# Patient Record
Sex: MALE | Race: OTHER | URBAN - METROPOLITAN AREA
[De-identification: names, ages, dates, MRNs, and addresses within clinical notes are randomized per-mention and may not be internally consistent; named-entity substitution may affect disease eponyms.]

---

## 2023-08-28 ENCOUNTER — EMERGENCY (EMERGENCY)
Facility: HOSPITAL | Age: 63
LOS: 0 days | Discharge: HOME | End: 2023-08-28
Attending: STUDENT IN AN ORGANIZED HEALTH CARE EDUCATION/TRAINING PROGRAM
Payer: MEDICAID

## 2023-08-28 VITALS
OXYGEN SATURATION: 96 % | WEIGHT: 160.06 LBS | HEART RATE: 99 BPM | SYSTOLIC BLOOD PRESSURE: 141 MMHG | DIASTOLIC BLOOD PRESSURE: 82 MMHG | HEIGHT: 68 IN | TEMPERATURE: 100 F | RESPIRATION RATE: 20 BRPM

## 2023-08-28 VITALS
SYSTOLIC BLOOD PRESSURE: 150 MMHG | RESPIRATION RATE: 20 BRPM | TEMPERATURE: 99 F | DIASTOLIC BLOOD PRESSURE: 84 MMHG | HEART RATE: 90 BPM | OXYGEN SATURATION: 96 %

## 2023-08-28 DIAGNOSIS — Z20.822 CONTACT WITH AND (SUSPECTED) EXPOSURE TO COVID-19: ICD-10-CM

## 2023-08-28 DIAGNOSIS — R07.89 OTHER CHEST PAIN: ICD-10-CM

## 2023-08-28 DIAGNOSIS — M79.10 MYALGIA, UNSPECIFIED SITE: ICD-10-CM

## 2023-08-28 DIAGNOSIS — B34.9 VIRAL INFECTION, UNSPECIFIED: ICD-10-CM

## 2023-08-28 DIAGNOSIS — E78.5 HYPERLIPIDEMIA, UNSPECIFIED: ICD-10-CM

## 2023-08-28 DIAGNOSIS — R11.0 NAUSEA: ICD-10-CM

## 2023-08-28 DIAGNOSIS — R51.9 HEADACHE, UNSPECIFIED: ICD-10-CM

## 2023-08-28 DIAGNOSIS — I10 ESSENTIAL (PRIMARY) HYPERTENSION: ICD-10-CM

## 2023-08-28 DIAGNOSIS — E11.9 TYPE 2 DIABETES MELLITUS WITHOUT COMPLICATIONS: ICD-10-CM

## 2023-08-28 LAB
ALBUMIN SERPL ELPH-MCNC: 3.6 G/DL — SIGNIFICANT CHANGE UP (ref 3.3–5)
ALP SERPL-CCNC: 80 U/L — SIGNIFICANT CHANGE UP (ref 40–120)
ALT FLD-CCNC: 70 U/L — SIGNIFICANT CHANGE UP (ref 12–78)
ANION GAP SERPL CALC-SCNC: 7 MMOL/L — SIGNIFICANT CHANGE UP (ref 5–17)
AST SERPL-CCNC: 48 U/L — HIGH (ref 15–37)
BASOPHILS # BLD AUTO: 0.01 K/UL — SIGNIFICANT CHANGE UP (ref 0–0.2)
BASOPHILS NFR BLD AUTO: 0.2 % — SIGNIFICANT CHANGE UP (ref 0–2)
BILIRUB SERPL-MCNC: 0.7 MG/DL — SIGNIFICANT CHANGE UP (ref 0.2–1.2)
BUN SERPL-MCNC: 13 MG/DL — SIGNIFICANT CHANGE UP (ref 7–23)
CALCIUM SERPL-MCNC: 8.6 MG/DL — SIGNIFICANT CHANGE UP (ref 8.5–10.1)
CHLORIDE SERPL-SCNC: 97 MMOL/L — SIGNIFICANT CHANGE UP (ref 96–108)
CO2 SERPL-SCNC: 26 MMOL/L — SIGNIFICANT CHANGE UP (ref 22–31)
CREAT SERPL-MCNC: 1.07 MG/DL — SIGNIFICANT CHANGE UP (ref 0.5–1.3)
EGFR: 78 ML/MIN/1.73M2 — SIGNIFICANT CHANGE UP
EOSINOPHIL # BLD AUTO: 0 K/UL — SIGNIFICANT CHANGE UP (ref 0–0.5)
EOSINOPHIL NFR BLD AUTO: 0 % — SIGNIFICANT CHANGE UP (ref 0–6)
GLUCOSE SERPL-MCNC: 201 MG/DL — HIGH (ref 70–99)
HCT VFR BLD CALC: 41.1 % — SIGNIFICANT CHANGE UP (ref 39–50)
HGB BLD-MCNC: 14.2 G/DL — SIGNIFICANT CHANGE UP (ref 13–17)
IMM GRANULOCYTES NFR BLD AUTO: 0.3 % — SIGNIFICANT CHANGE UP (ref 0–0.9)
LIDOCAIN IGE QN: 147 U/L — HIGH (ref 13–75)
LYMPHOCYTES # BLD AUTO: 0.67 K/UL — LOW (ref 1–3.3)
LYMPHOCYTES # BLD AUTO: 11.1 % — LOW (ref 13–44)
MAGNESIUM SERPL-MCNC: 2.1 MG/DL — SIGNIFICANT CHANGE UP (ref 1.6–2.6)
MCHC RBC-ENTMCNC: 30 PG — SIGNIFICANT CHANGE UP (ref 27–34)
MCHC RBC-ENTMCNC: 34.5 G/DL — SIGNIFICANT CHANGE UP (ref 32–36)
MCV RBC AUTO: 86.9 FL — SIGNIFICANT CHANGE UP (ref 80–100)
MONOCYTES # BLD AUTO: 0.66 K/UL — SIGNIFICANT CHANGE UP (ref 0–0.9)
MONOCYTES NFR BLD AUTO: 10.9 % — SIGNIFICANT CHANGE UP (ref 2–14)
NEUTROPHILS # BLD AUTO: 4.67 K/UL — SIGNIFICANT CHANGE UP (ref 1.8–7.4)
NEUTROPHILS NFR BLD AUTO: 77.5 % — HIGH (ref 43–77)
NRBC # BLD: 0 /100 WBCS — SIGNIFICANT CHANGE UP (ref 0–0)
PLATELET # BLD AUTO: 161 K/UL — SIGNIFICANT CHANGE UP (ref 150–400)
POTASSIUM SERPL-MCNC: 3.7 MMOL/L — SIGNIFICANT CHANGE UP (ref 3.5–5.3)
POTASSIUM SERPL-SCNC: 3.7 MMOL/L — SIGNIFICANT CHANGE UP (ref 3.5–5.3)
PROT SERPL-MCNC: 7.3 GM/DL — SIGNIFICANT CHANGE UP (ref 6–8.3)
RAPID RVP RESULT: SIGNIFICANT CHANGE UP
RBC # BLD: 4.73 M/UL — SIGNIFICANT CHANGE UP (ref 4.2–5.8)
RBC # FLD: 11.9 % — SIGNIFICANT CHANGE UP (ref 10.3–14.5)
SARS-COV-2 RNA SPEC QL NAA+PROBE: SIGNIFICANT CHANGE UP
SODIUM SERPL-SCNC: 130 MMOL/L — LOW (ref 135–145)
TROPONIN I, HIGH SENSITIVITY RESULT: 5.6 NG/L — SIGNIFICANT CHANGE UP
WBC # BLD: 6.03 K/UL — SIGNIFICANT CHANGE UP (ref 3.8–10.5)
WBC # FLD AUTO: 6.03 K/UL — SIGNIFICANT CHANGE UP (ref 3.8–10.5)

## 2023-08-28 PROCEDURE — 71045 X-RAY EXAM CHEST 1 VIEW: CPT | Mod: 26

## 2023-08-28 PROCEDURE — 93010 ELECTROCARDIOGRAM REPORT: CPT

## 2023-08-28 PROCEDURE — 99285 EMERGENCY DEPT VISIT HI MDM: CPT

## 2023-08-28 RX ORDER — ACETAMINOPHEN 500 MG
1000 TABLET ORAL ONCE
Refills: 0 | Status: COMPLETED | OUTPATIENT
Start: 2023-08-28 | End: 2023-08-28

## 2023-08-28 RX ORDER — SODIUM CHLORIDE 9 MG/ML
1000 INJECTION INTRAMUSCULAR; INTRAVENOUS; SUBCUTANEOUS ONCE
Refills: 0 | Status: COMPLETED | OUTPATIENT
Start: 2023-08-28 | End: 2023-08-28

## 2023-08-28 RX ORDER — METOCLOPRAMIDE HCL 10 MG
10 TABLET ORAL ONCE
Refills: 0 | Status: COMPLETED | OUTPATIENT
Start: 2023-08-28 | End: 2023-08-28

## 2023-08-28 RX ADMIN — SODIUM CHLORIDE 1000 MILLILITER(S): 9 INJECTION INTRAMUSCULAR; INTRAVENOUS; SUBCUTANEOUS at 17:10

## 2023-08-28 RX ADMIN — Medication 400 MILLIGRAM(S): at 17:10

## 2023-08-28 RX ADMIN — Medication 10 MILLIGRAM(S): at 18:44

## 2023-08-28 NOTE — ED PROVIDER NOTE - CLINICAL SUMMARY MEDICAL DECISION MAKING FREE TEXT BOX
63M with PMH DM2, HTN, HLD who presents to ED with fever/chills x yesterday associated with myalgias, nausea, headaches, and chest pressure. Pt reporting fever, didn't check temperature at home, reporting chills overnight during sleep. Reporting intermittent midsternal chest pressure, non-exertional/non-pleuritic/non-radiating in nature, pt hasn't taken any OTC medications for relief. No known ill contacts, no recent travel, no recent illness. Patient currently afebrile, hemodynamically stable, spO2 96%. Based on history and physical, differentials include but are not limited to viral illness/COVID/Flu, electrolyte abnormality, anemia, primary headache, ACS less likely due to low heart score. Plan to assess patient for acute pathology as listed above with Labs, EKG, CXR, UA. Will administer medications for symptomatic relief, follow-up on results, and reassess.

## 2023-08-28 NOTE — ED PROVIDER NOTE - NSFOLLOWUPINSTRUCTIONS_ED_ALL_ED_FT
Medications  - Take Tylenol (Acetaminophen) 650 mg every 6 hours AND/OR Motrin (Ibuprofen) 600 mg every 8 hours as needed for pain/fever.    Advance activity as tolerated.  Continue all previously prescribed medications as directed unless otherwise instructed.  Follow up with your primary care physician in 48-72 hours- bring copies of your results.  Return to the ER for worsening or persistent symptoms, and/or ANY NEW OR CONCERNING SYMPTOMS fever not controlled with medications, chest pain associated with shortness of breath/difficulty breathing, abdominal pain associated with intractable nausea/vomiting, fainting/passing out, weakness/numbness/tingling of arms/legs, neck pain/stiffness, facial droop, severe headaches with vision changes or eye sensitivity to light. If you have issues obtaining follow up, please call: [phone] to obtain a doctor or specialist who takes your insurance in your area.  You may call [phone] to make an appointment with the internal medicine clinic.    Viruses are tiny germs that can get into a person's body and cause illness. There are many different types of viruses, and they cause many types of illness. Viral illnesses can range from mild to severe. They can affect various parts of the body.    Common illnesses that are caused by a virus include colds and the flu (influenza). Viral illnesses also include serious conditions, such as HIV (human immunodeficiency virus) infection and AIDS (acquired immunodeficiency syndrome). A few viruses have been linked to certain cancers.    What are the causes?  Many types of viruses can cause illness. Viruses invade cells in your body, multiply, and cause the infected cells to work abnormally or die. When these cells die, they release more of the virus. When this happens, you develop symptoms of the illness, and the virus continues to spread to other cells. If the virus takes over the function of the cell, it can cause the cell to divide and grow out of control. This happens when a virus causes cancer.    Different viruses get into the body in different ways. You can get a virus by:    Swallowing food or water that has come in contact with the virus (is contaminated).  Breathing in droplets that have been coughed or sneezed into the air by an infected person.  Touching a surface that has been contaminated with the virus and then touching your eyes, nose, or mouth.  Being bitten by an insect or animal that carries the virus.  Having sexual contact with a person who is infected with the virus.  Being exposed to blood or fluids that contain the virus, either through an open cut or during a transfusion.    If a virus enters your body, your body's defense system (immune system) will try to fight the virus. You may be at higher risk for a viral illness if your immune system is weak.    What are the signs or symptoms?  You may have these symptoms, depending on the type of virus and the location of the cells that it invades:    Cold and flu viruses:    Fever.  Headache.  Sore throat.  Muscle aches.  Stuffy nose (nasal congestion).  Cough.  Digestive system (gastrointestinal) viruses:    Fever.  Pain in the abdomen.  Nausea.  Diarrhea.  Liver viruses (hepatitis):    Loss of appetite.  Tiredness.  Skin or the white parts of your eyes turning yellow (jaundice).  Brain and spinal cord viruses:    Fever.  Headache.  Stiff neck.  Nausea and vomiting.  Confusion or sleepiness.  Skin viruses:    Warts.  Itching.  Rash.  Sexually transmitted viruses:    Discharge.  Swelling.  Redness.  Rash.    How is this diagnosed?  This condition may be diagnosed based on one or more of the following:    Symptoms.  Medical history.  Physical exam.  Blood test, sample of mucus from your lungs (sputum sample), stool sample, or a swab of body fluids or a skin sore (lesion).    How is this treated?  Viruses can be hard to treat because they live within cells. Antibiotic medicines do not treat viruses because these medicines do not get inside cells. Treatment for a viral illness may include:    Resting and drinking plenty of fluids.  Medicines to relieve symptoms. These can include over-the-counter medicine for pain and fever, medicines for cough or congestion, and medicines to relieve diarrhea.  Antiviral medicines. These medicines are available only for certain types of viruses. They may help reduce flu symptoms if taken early in the infection. There are also many antiviral medicines for hepatitis and for HIV and AIDS.    Some viral illnesses can be prevented with vaccinations. A common example is the flu shot.    Follow these instructions at home:    Medicines    Take over-the-counter and prescription medicines only as told by your health care provider.  If you were prescribed an antiviral medicine, take it as told by your health care provider. Do not stop taking the antiviral even if you start to feel better.  Be aware of when antibiotics are needed and when they are not needed. Antibiotics do not treat viruses. You may get an antibiotic if your health care provider thinks that you may have, or are at risk for, a bacterial infection and you have a viral infection.    Do not ask for an antibiotic prescription if you have been diagnosed with a viral illness. Antibiotics will not make your illness go away faster.  Frequently taking antibiotics when they are not needed can lead to antibiotic resistance. When this develops, the medicine no longer works against the bacteria that it normally fights.      General instructions     Drink enough fluids to keep your urine pale yellow.  Rest as much as possible.  Return to your normal activities as told by your health care provider. Ask your health care provider what activities are safe for you.  Keep all follow-up visits as told by your health care provider. This is important.    How is this prevented?     To reduce your risk of viral illness:    Wash your hands often with soap and water for at least 20 seconds. If soap and water are not available, use hand .  Avoid touching your nose, eyes, and mouth, especially if you have not washed your hands recently.  If anyone in your household has a viral infection, clean all household surfaces that may have been in contact with the virus. Use soap and hot water. You may also use bleach that you have added water to (diluted).  Stay away from people who are sick with symptoms of a viral infection.  Do not share items such as toothbrushes and water bottles with other people.  Keep your vaccinations up to date. This includes getting a yearly flu shot.  Eat a healthy diet and get plenty of rest.    Contact a health care provider if:  You have symptoms of a viral illness that do not go away.  Your symptoms come back after going away.  Your symptoms get worse.    Get help right away if you have:  Trouble breathing.  A severe headache or a stiff neck.  Severe vomiting or pain in your abdomen.    These symptoms may represent a serious problem that is an emergency. Do not wait to see if the symptoms will go away. Get medical help right away. Call your local emergency services (911 in the [place] Do not drive yourself to the hospital.    Summary  Viruses are types of germs that can get into a person's body and cause illness. Viral illnesses can range from mild to severe. They can affect various parts of the body.  Viruses can be hard to treat. There are medicines to relieve symptoms, and there are some antiviral medicines.  If you were prescribed an antiviral medicine, take it as told by your health care provider. Do not stop taking the antiviral even if you start to feel better.  Contact a health care provider if you have symptoms of a viral illness that do not go away.    ADDITIONAL NOTES AND INSTRUCTIONS    Please follow up with your Primary MD in 24-48 hr.  Seek immediate medical care for any new/worsening signs or symptoms.

## 2023-08-28 NOTE — ED PROVIDER NOTE - OBJECTIVE STATEMENT
63M with PMH DM2, HTN, HLD who presents to ED with fever/chills x yesterday associated with myalgias, nausea, headaches, and chest pressure. 63M with PMH DM2, HTN, HLD who presents to ED with fever/chills x yesterday associated with myalgias, nausea, headaches, and chest pressure. Pt reporting fever, didn't check temperature at home, reporting chills overnight during sleep. Reporting intermittent midsternal chest pressure, non-exertional/non-pleuritic/non-radiating in nature, pt hasn't taken any OTC medications for relief. No known ill contacts, no recent travel, no recent illness. Denies shortness of breath, abdominal pain, vomiting, diarrhea, dysuria, urinary frequency/urgency, extremity weakness/numbness/tingling, lightheadedness, dizziness, vision changes/blurry vision, neck pain/stiffness.

## 2023-08-28 NOTE — ED ADULT NURSE NOTE - NS ED NURSE LEVEL OF CONSCIOUSNESS MENTAL STATUS
Pt wishes a return call regarding alternate asthma medication as discussed.  Please advise   Awake/Alert

## 2023-08-28 NOTE — ED PROVIDER NOTE - PROGRESS NOTE DETAILS
RADHA Duran: Workup reviewed - labs within normal limits/not actionable, EKG NSR, CXR without infiltrate/consolidations. Results endorsed including unexpected incidental findings (copy of reports provided to patient). Shared Decision Making - Reassessment performed, pt with improvement of symptoms with medications and IVF's. Patient is medically stable for discharge. Strict return precautions given, discussed red flag signs/symptoms. Patient to follow up with PMD. Patient displays understanding and agreeable with plan, comfortable with discharge plan home. Plan for discharge discussed with Dr. Castano who agrees with disposition and discharge plan.

## 2023-08-28 NOTE — ED PROVIDER NOTE - PATIENT PORTAL LINK FT
You can access the FollowMyHealth Patient Portal offered by [facility] by registering at the following website: [email or web address]. By joining [email or web address]’s FollowMyHealth portal, you will also be able to view your health information using other applications (apps) compatible with our system.

## 2023-08-28 NOTE — ED ADULT NURSE NOTE - NSFALLUNIVINTERV_ED_ALL_ED
Bed/Stretcher in lowest position, wheels locked, appropriate side rails in place/Call bell, personal items and telephone in reach/Instruct patient to call for assistance before getting out of bed/chair/stretcher/Non-slip footwear applied when patient is off stretcher/[place] to call system/Physically safe environment - no spills, clutter or unnecessary equipment/Purposeful proactive rounding/Room/bathroom lighting operational, light cord in reach

## 2023-08-28 NOTE — ED PROVIDER NOTE - PRINCIPAL DIAGNOSIS
Return for new or worsening symptoms. Your work-up tonight including basic blood work, cardiac enzymes and your D-dimer which can indicate the presence of a blood clot was all reassuring. There were no abnormalities on your chest x-ray. It is still recommended that you follow-up with your primary care and also cardiology. You may try an anti-inflammatory such as ibuprofen as needed for discomfort.
Viral illness

## 2023-08-28 NOTE — ED ADULT NURSE NOTE - NS_SISCREENINGSR_GEN_ALL_ED
You were seen in the Emergency Department for chest pain. Follow up with your PCP in  2-3 days. Return to the Emergency Department if you develop any new or worsening symptoms, chest pain, shortness of breath, or for any other concerns.
Negative

## 2023-08-28 NOTE — ED ADULT TRIAGE NOTE - CHIEF COMPLAINT QUOTE
Entire body hurts since last night , left sided chest pain, nausea no Vomiting or diarrhea, headaches

## 2023-08-28 NOTE — ED PROVIDER NOTE - NS ED ATTENDING STATEMENT MOD
This was a shared visit with the DEION. I reviewed and verified the documentation and independently performed the documented:

## 2023-08-28 NOTE — ED ADULT NURSE NOTE - OBJECTIVE STATEMENT
pt c/o headache, bodyaches since yesterday.  +nausea,  denies v/d.    pt states he had diarrhea 2 days ago.denies CP/SOB

## 2025-03-05 PROBLEM — I10 ESSENTIAL (PRIMARY) HYPERTENSION: Chronic | Status: ACTIVE | Noted: 2023-08-28

## 2025-03-05 PROBLEM — E11.9 TYPE 2 DIABETES MELLITUS WITHOUT COMPLICATIONS: Chronic | Status: ACTIVE | Noted: 2023-08-28

## 2025-03-05 PROBLEM — E78.5 HYPERLIPIDEMIA, UNSPECIFIED: Chronic | Status: ACTIVE | Noted: 2023-08-28

## 2025-04-17 ENCOUNTER — APPOINTMENT (OUTPATIENT)
Dept: SURGERY | Facility: CLINIC | Age: 65
End: 2025-04-17

## 2025-04-17 ENCOUNTER — NON-APPOINTMENT (OUTPATIENT)
Age: 65
End: 2025-04-17

## 2025-04-17 VITALS
HEART RATE: 83 BPM | TEMPERATURE: 97.9 F | WEIGHT: 155 LBS | BODY MASS INDEX: 23.49 KG/M2 | SYSTOLIC BLOOD PRESSURE: 126 MMHG | OXYGEN SATURATION: 96 % | HEIGHT: 68 IN | DIASTOLIC BLOOD PRESSURE: 80 MMHG

## 2025-04-17 DIAGNOSIS — Z82.49 FAMILY HISTORY OF ISCHEMIC HEART DISEASE AND OTHER DISEASES OF THE CIRCULATORY SYSTEM: ICD-10-CM

## 2025-04-17 DIAGNOSIS — Z83.3 FAMILY HISTORY OF DIABETES MELLITUS: ICD-10-CM

## 2025-04-17 DIAGNOSIS — K40.20 BILATERAL INGUINAL HERNIA, W/OUT OBSTRUCTION OR GANGRENE, NOT SPECIFIED AS RECURRENT: ICD-10-CM

## 2025-04-17 DIAGNOSIS — I10 ESSENTIAL (PRIMARY) HYPERTENSION: ICD-10-CM

## 2025-04-17 DIAGNOSIS — E11.9 TYPE 2 DIABETES MELLITUS W/OUT COMPLICATIONS: ICD-10-CM

## 2025-04-17 DIAGNOSIS — Z87.891 PERSONAL HISTORY OF NICOTINE DEPENDENCE: ICD-10-CM

## 2025-04-17 PROBLEM — Z00.00 ENCOUNTER FOR PREVENTIVE HEALTH EXAMINATION: Status: ACTIVE | Noted: 2025-04-17

## 2025-04-17 PROCEDURE — 99202 OFFICE O/P NEW SF 15 MIN: CPT

## 2025-04-17 RX ORDER — ATORVASTATIN CALCIUM 80 MG/1
TABLET, FILM COATED ORAL
Refills: 0 | Status: ACTIVE | COMMUNITY

## 2025-04-17 RX ORDER — AMLODIPINE BESYLATE 5 MG/1
TABLET ORAL
Refills: 0 | Status: ACTIVE | COMMUNITY

## 2025-04-17 RX ORDER — METFORMIN HYDROCHLORIDE 1000 MG/1
1000 TABLET, COATED ORAL
Refills: 0 | Status: ACTIVE | COMMUNITY

## 2025-06-26 ENCOUNTER — APPOINTMENT (OUTPATIENT)
Dept: SURGERY | Facility: CLINIC | Age: 65
End: 2025-06-26

## 2025-06-26 VITALS
HEIGHT: 68 IN | DIASTOLIC BLOOD PRESSURE: 80 MMHG | OXYGEN SATURATION: 98 % | HEART RATE: 81 BPM | TEMPERATURE: 98 F | BODY MASS INDEX: 23.49 KG/M2 | WEIGHT: 155 LBS | SYSTOLIC BLOOD PRESSURE: 145 MMHG

## 2025-06-26 PROCEDURE — 99213 OFFICE O/P EST LOW 20 MIN: CPT

## 2025-07-07 NOTE — ED PROVIDER NOTE - WR ORDER NAME 1
[facility]        Patient is here for a follow up.    Pt is currently taking Statin.    BP (!) 152/91 (BP Location: Left arm, Patient Position: Sitting, Cuff Size: Adult Regular)   Pulse 62     The provider has been notified that the patient has no concerns.     Questions patient would like addressed today are: N/A.    Refills are needed: N/A    Has homecare services and agency name:  Mila Bernardo MA     Xray Chest 1 View- PORTABLE-Urgent

## 2025-07-28 ENCOUNTER — OUTPATIENT (OUTPATIENT)
Dept: OUTPATIENT SERVICES | Facility: HOSPITAL | Age: 65
LOS: 1 days | End: 2025-07-28
Payer: MEDICARE

## 2025-07-28 VITALS
RESPIRATION RATE: 18 BRPM | HEIGHT: 68 IN | HEART RATE: 66 BPM | WEIGHT: 159.61 LBS | SYSTOLIC BLOOD PRESSURE: 131 MMHG | OXYGEN SATURATION: 96 % | TEMPERATURE: 97 F | DIASTOLIC BLOOD PRESSURE: 81 MMHG

## 2025-07-28 DIAGNOSIS — Z98.890 OTHER SPECIFIED POSTPROCEDURAL STATES: Chronic | ICD-10-CM

## 2025-07-28 DIAGNOSIS — K40.20 BILATERAL INGUINAL HERNIA, WITHOUT OBSTRUCTION OR GANGRENE, NOT SPECIFIED AS RECURRENT: ICD-10-CM

## 2025-07-28 DIAGNOSIS — I10 ESSENTIAL (PRIMARY) HYPERTENSION: ICD-10-CM

## 2025-07-28 DIAGNOSIS — E11.9 TYPE 2 DIABETES MELLITUS WITHOUT COMPLICATIONS: ICD-10-CM

## 2025-07-28 DIAGNOSIS — Z01.818 ENCOUNTER FOR OTHER PREPROCEDURAL EXAMINATION: ICD-10-CM

## 2025-07-28 LAB
ANION GAP SERPL CALC-SCNC: 4 MMOL/L — LOW (ref 5–17)
BUN SERPL-MCNC: 18 MG/DL — SIGNIFICANT CHANGE UP (ref 7–23)
CALCIUM SERPL-MCNC: 8.6 MG/DL — SIGNIFICANT CHANGE UP (ref 8.5–10.1)
CHLORIDE SERPL-SCNC: 106 MMOL/L — SIGNIFICANT CHANGE UP (ref 96–108)
CO2 SERPL-SCNC: 29 MMOL/L — SIGNIFICANT CHANGE UP (ref 22–31)
CREAT SERPL-MCNC: 1.06 MG/DL — SIGNIFICANT CHANGE UP (ref 0.5–1.3)
EGFR: 78 ML/MIN/1.73M2 — SIGNIFICANT CHANGE UP
EGFR: 78 ML/MIN/1.73M2 — SIGNIFICANT CHANGE UP
GLUCOSE SERPL-MCNC: 172 MG/DL — HIGH (ref 70–99)
HCT VFR BLD CALC: 40.9 % — SIGNIFICANT CHANGE UP (ref 39–50)
HGB BLD-MCNC: 14 G/DL — SIGNIFICANT CHANGE UP (ref 13–17)
MCHC RBC-ENTMCNC: 30.8 PG — SIGNIFICANT CHANGE UP (ref 27–34)
MCHC RBC-ENTMCNC: 34.2 G/DL — SIGNIFICANT CHANGE UP (ref 32–36)
MCV RBC AUTO: 89.9 FL — SIGNIFICANT CHANGE UP (ref 80–100)
NRBC BLD AUTO-RTO: 0 /100 WBCS — SIGNIFICANT CHANGE UP (ref 0–0)
PLATELET # BLD AUTO: 212 K/UL — SIGNIFICANT CHANGE UP (ref 150–400)
POTASSIUM SERPL-MCNC: 3.9 MMOL/L — SIGNIFICANT CHANGE UP (ref 3.5–5.3)
POTASSIUM SERPL-SCNC: 3.9 MMOL/L — SIGNIFICANT CHANGE UP (ref 3.5–5.3)
RBC # BLD: 4.55 M/UL — SIGNIFICANT CHANGE UP (ref 4.2–5.8)
RBC # FLD: 11.9 % — SIGNIFICANT CHANGE UP (ref 10.3–14.5)
SODIUM SERPL-SCNC: 139 MMOL/L — SIGNIFICANT CHANGE UP (ref 135–145)
WBC # BLD: 5.84 K/UL — SIGNIFICANT CHANGE UP (ref 3.8–10.5)
WBC # FLD AUTO: 5.84 K/UL — SIGNIFICANT CHANGE UP (ref 3.8–10.5)

## 2025-07-28 PROCEDURE — 93010 ELECTROCARDIOGRAM REPORT: CPT

## 2025-07-29 LAB
A1C WITH ESTIMATED AVERAGE GLUCOSE RESULT: 8.1 % — HIGH (ref 4–5.6)
ESTIMATED AVERAGE GLUCOSE: 186 MG/DL — HIGH (ref 68–114)

## 2025-08-11 ENCOUNTER — TRANSCRIPTION ENCOUNTER (OUTPATIENT)
Age: 65
End: 2025-08-11

## 2025-08-11 ENCOUNTER — APPOINTMENT (OUTPATIENT)
Dept: SURGERY | Facility: HOSPITAL | Age: 65
End: 2025-08-11

## 2025-08-11 ENCOUNTER — OUTPATIENT (OUTPATIENT)
Dept: OUTPATIENT SERVICES | Facility: HOSPITAL | Age: 65
LOS: 1 days | End: 2025-08-11
Payer: MEDICARE

## 2025-08-11 VITALS
RESPIRATION RATE: 15 BRPM | HEART RATE: 67 BPM | DIASTOLIC BLOOD PRESSURE: 68 MMHG | TEMPERATURE: 97 F | OXYGEN SATURATION: 97 % | SYSTOLIC BLOOD PRESSURE: 120 MMHG

## 2025-08-11 VITALS
SYSTOLIC BLOOD PRESSURE: 156 MMHG | OXYGEN SATURATION: 99 % | DIASTOLIC BLOOD PRESSURE: 87 MMHG | WEIGHT: 160.06 LBS | RESPIRATION RATE: 14 BRPM | HEIGHT: 68 IN | HEART RATE: 64 BPM | TEMPERATURE: 98 F

## 2025-08-11 DIAGNOSIS — Z98.890 OTHER SPECIFIED POSTPROCEDURAL STATES: Chronic | ICD-10-CM

## 2025-08-11 LAB
GLUCOSE BLDC GLUCOMTR-MCNC: 105 MG/DL — HIGH (ref 70–99)
GLUCOSE BLDC GLUCOMTR-MCNC: 139 MG/DL — HIGH (ref 70–99)

## 2025-08-11 PROCEDURE — 49650 LAP ING HERNIA REPAIR INIT: CPT | Mod: 50

## 2025-08-11 PROCEDURE — 49650 LAP ING HERNIA REPAIR INIT: CPT | Mod: AS,50

## 2025-08-11 PROCEDURE — 49591 RPR AA HRN 1ST < 3 CM RDC: CPT | Mod: AS

## 2025-08-11 PROCEDURE — S2900 ROBOTIC SURGICAL SYSTEM: CPT | Mod: 50

## 2025-08-11 DEVICE — MESH HERNIA INGUINAL PROGRIP LAPAROSCOPIC 15 X 10CM RIGHT: Type: IMPLANTABLE DEVICE | Site: LEFT, RIGHT | Status: FUNCTIONAL

## 2025-08-11 DEVICE — MESH HERNIA INGUINAL PROGRIP LAPAROSCOPIC 15 X 10CM LEFT: Type: IMPLANTABLE DEVICE | Site: LEFT, RIGHT | Status: FUNCTIONAL

## 2025-08-11 RX ORDER — OXYCODONE HYDROCHLORIDE 30 MG/1
1 TABLET ORAL
Qty: 10 | Refills: 0
Start: 2025-08-11

## 2025-08-11 RX ORDER — ROSUVASTATIN CALCIUM 20 MG/1
1 TABLET, FILM COATED ORAL
Refills: 0 | DISCHARGE

## 2025-08-11 RX ORDER — ONDANSETRON HCL/PF 4 MG/2 ML
4 VIAL (ML) INJECTION ONCE
Refills: 0 | Status: DISCONTINUED | OUTPATIENT
Start: 2025-08-11 | End: 2025-08-12

## 2025-08-11 RX ORDER — SODIUM CHLORIDE 9 G/1000ML
1000 INJECTION, SOLUTION INTRAVENOUS
Refills: 0 | Status: DISCONTINUED | OUTPATIENT
Start: 2025-08-11 | End: 2025-08-12

## 2025-08-11 RX ORDER — HYDROMORPHONE/SOD CHLOR,ISO/PF 2 MG/10 ML
0.5 SYRINGE (ML) INJECTION
Refills: 0 | Status: DISCONTINUED | OUTPATIENT
Start: 2025-08-11 | End: 2025-08-12

## 2025-08-11 RX ORDER — AMLODIPINE AND BENAZEPRIL HYDROCHLORIDE 5; 20 MG/1; MG/1
1 CAPSULE ORAL
Refills: 0 | DISCHARGE

## 2025-08-11 RX ORDER — METFORMIN HYDROCHLORIDE 850 MG/1
1 TABLET ORAL
Refills: 0 | DISCHARGE

## 2025-08-11 RX ADMIN — Medication 0.5 MILLIGRAM(S): at 10:08

## 2025-08-11 RX ADMIN — SODIUM CHLORIDE 75 MILLILITER(S): 9 INJECTION, SOLUTION INTRAVENOUS at 09:42

## 2025-08-11 RX ADMIN — Medication 0.5 MILLIGRAM(S): at 09:51

## 2025-08-15 DIAGNOSIS — Z87.891 PERSONAL HISTORY OF NICOTINE DEPENDENCE: ICD-10-CM

## 2025-08-15 DIAGNOSIS — Z79.84 LONG TERM (CURRENT) USE OF ORAL HYPOGLYCEMIC DRUGS: ICD-10-CM

## 2025-08-15 DIAGNOSIS — E11.9 TYPE 2 DIABETES MELLITUS WITHOUT COMPLICATIONS: ICD-10-CM

## 2025-08-15 DIAGNOSIS — I10 ESSENTIAL (PRIMARY) HYPERTENSION: ICD-10-CM

## 2025-08-15 DIAGNOSIS — E78.5 HYPERLIPIDEMIA, UNSPECIFIED: ICD-10-CM

## 2025-08-15 DIAGNOSIS — K40.20 BILATERAL INGUINAL HERNIA, WITHOUT OBSTRUCTION OR GANGRENE, NOT SPECIFIED AS RECURRENT: ICD-10-CM

## 2025-08-28 ENCOUNTER — APPOINTMENT (OUTPATIENT)
Dept: SURGERY | Facility: CLINIC | Age: 65
End: 2025-08-28
Payer: MEDICARE

## 2025-08-28 VITALS
BODY MASS INDEX: 23.49 KG/M2 | TEMPERATURE: 98 F | SYSTOLIC BLOOD PRESSURE: 168 MMHG | OXYGEN SATURATION: 97 % | HEART RATE: 76 BPM | DIASTOLIC BLOOD PRESSURE: 93 MMHG | HEIGHT: 68 IN | WEIGHT: 155 LBS

## 2025-08-28 DIAGNOSIS — K40.20 BILATERAL INGUINAL HERNIA, W/OUT OBSTRUCTION OR GANGRENE, NOT SPECIFIED AS RECURRENT: ICD-10-CM

## 2025-08-28 PROCEDURE — 99024 POSTOP FOLLOW-UP VISIT: CPT

## (undated) DEVICE — SUT MONOCRYL 4-0 27" PS-2 UNDYED

## (undated) DEVICE — Device

## (undated) DEVICE — TUBING STRYKER PNEUMOCLEAR SMOKE EVACUATION HIGH FLOW

## (undated) DEVICE — NDL HYPO SAFE 25G X 1.5" (ORANGE)

## (undated) DEVICE — SUT VICRYL 2-0 27" SH UNDYED

## (undated) DEVICE — D HELP - CLEARVIEW CLEARIFY SYSTEM

## (undated) DEVICE — POSITIONER PURPLE ARM ONE STEP (LARGE)

## (undated) DEVICE — ELCTR STRYKER NEPTUNE SMOKE EVACUATION PENCIL (GREEN)

## (undated) DEVICE — DRAPE 3/4 SHEET W REINFORCEMENT 56X77"

## (undated) DEVICE — DRAPE SPLIT SHEET 77" X 120"

## (undated) DEVICE — SHEARS COVIDIEN ENDO SHEAR 5MM X 31CM W UNIPOLAR CAUTERY

## (undated) DEVICE — XI OBTURATOR OPTICAL BLADELESS 8MM

## (undated) DEVICE — XI SCISSOR TIP COVER

## (undated) DEVICE — XI SEAL UNIVERSIAL 5-12MM

## (undated) DEVICE — PACK GENERAL LAPAROSCOPY

## (undated) DEVICE — SUT VLOC 180 2-0 6" GS-22 GREEN

## (undated) DEVICE — SUT VICRYL 0 27" UR-6